# Patient Record
Sex: FEMALE | Race: WHITE | URBAN - METROPOLITAN AREA
[De-identification: names, ages, dates, MRNs, and addresses within clinical notes are randomized per-mention and may not be internally consistent; named-entity substitution may affect disease eponyms.]

---

## 2021-03-26 ENCOUNTER — OFFICE VISIT (OUTPATIENT)
Dept: OBGYN CLINIC | Facility: CLINIC | Age: 36
End: 2021-03-26
Payer: COMMERCIAL

## 2021-03-26 VITALS
HEIGHT: 70 IN | BODY MASS INDEX: 20.21 KG/M2 | DIASTOLIC BLOOD PRESSURE: 74 MMHG | HEART RATE: 65 BPM | WEIGHT: 141.19 LBS | SYSTOLIC BLOOD PRESSURE: 116 MMHG

## 2021-03-26 DIAGNOSIS — Z30.41 SURVEILLANCE FOR BIRTH CONTROL, ORAL CONTRACEPTIVES: ICD-10-CM

## 2021-03-26 DIAGNOSIS — Z01.419 WELL WOMAN EXAM WITH ROUTINE GYNECOLOGICAL EXAM: Primary | ICD-10-CM

## 2021-03-26 DIAGNOSIS — Z12.4 CERVICAL CANCER SCREENING: ICD-10-CM

## 2021-03-26 PROCEDURE — 3078F DIAST BP <80 MM HG: CPT | Performed by: NURSE PRACTITIONER

## 2021-03-26 PROCEDURE — 99385 PREV VISIT NEW AGE 18-39: CPT | Performed by: NURSE PRACTITIONER

## 2021-03-26 PROCEDURE — 3008F BODY MASS INDEX DOCD: CPT | Performed by: NURSE PRACTITIONER

## 2021-03-26 PROCEDURE — 3074F SYST BP LT 130 MM HG: CPT | Performed by: NURSE PRACTITIONER

## 2021-03-26 RX ORDER — LEVONORGESTREL AND ETHINYL ESTRADIOL 0.1-0.02MG
KIT ORAL
COMMUNITY
Start: 2020-12-01 | End: 2021-03-26

## 2021-03-26 RX ORDER — LEVONORGESTREL AND ETHINYL ESTRADIOL 0.1-0.02MG
KIT ORAL
Qty: 4 PACKAGE | Refills: 4 | Status: SHIPPED | OUTPATIENT
Start: 2021-03-26

## 2021-03-26 NOTE — PROGRESS NOTES
Here for new gynecology visit. 28year old G 1  P 0. Patient's last menstrual period was 02/27/2021 (exact date). .     Here for Annual Gynecologic Exam. She is considering conception once she and her fiance are .     Menses are irregular, she skips tender. No organomegaly. No inguinal adenopathy. VULVA:  No lesions or erythema. VAGINA:  NO lesions, small white discharge. CERVIX: No lesions, friable. Pap smear done with HPV. UTERUS:  anteflexed and normal size. ADNEXA: No pain or masses.     IM

## 2021-03-31 LAB — HPV DNA (HIGH RISK): NOT DETECTED

## 2022-09-08 DIAGNOSIS — Z30.41 SURVEILLANCE FOR BIRTH CONTROL, ORAL CONTRACEPTIVES: ICD-10-CM

## 2022-09-08 NOTE — TELEPHONE ENCOUNTER
Last OV: 03/26/2021  Last refill date: 03/26/2021  Follow-up: 1 year  Next appt.: None scheduled    Patient over due for annual. Please contact her to schedule appt and then return to RN pool for refill.  Thank you

## 2022-09-28 RX ORDER — LEVONORGESTREL AND ETHINYL ESTRADIOL 0.1-0.02MG
KIT ORAL
Qty: 112 TABLET | Refills: 0 | OUTPATIENT
Start: 2022-09-28

## 2022-09-28 NOTE — TELEPHONE ENCOUNTER
Patient is undecided if coming back to us and would not like to be contacted anymore at this time she will call us     Thank you